# Patient Record
Sex: FEMALE | Race: WHITE | Employment: UNEMPLOYED | URBAN - METROPOLITAN AREA
[De-identification: names, ages, dates, MRNs, and addresses within clinical notes are randomized per-mention and may not be internally consistent; named-entity substitution may affect disease eponyms.]

---
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
## 2022-01-18 ENCOUNTER — TELEPHONE (OUTPATIENT)
Dept: FAMILY MEDICINE CLINIC | Age: 22
End: 2022-01-18

## 2022-01-18 RX ORDER — LEVONORGESTREL AND ETHINYL ESTRADIOL 0.1-0.02MG
1 KIT ORAL DAILY
Qty: 1 PACKET | Refills: 3 | Status: SHIPPED
Start: 2022-01-18 | End: 2022-05-04

## 2022-03-11 ENCOUNTER — OFFICE VISIT (OUTPATIENT)
Dept: FAMILY MEDICINE CLINIC | Age: 22
End: 2022-03-11
Payer: COMMERCIAL

## 2022-03-11 VITALS
HEART RATE: 62 BPM | OXYGEN SATURATION: 100 % | HEIGHT: 62 IN | TEMPERATURE: 98.1 F | DIASTOLIC BLOOD PRESSURE: 75 MMHG | WEIGHT: 136 LBS | SYSTOLIC BLOOD PRESSURE: 122 MMHG | BODY MASS INDEX: 25.03 KG/M2

## 2022-03-11 DIAGNOSIS — Z00.00 ROUTINE PHYSICAL EXAMINATION: ICD-10-CM

## 2022-03-11 DIAGNOSIS — F41.9 ANXIETY: Primary | ICD-10-CM

## 2022-03-11 PROCEDURE — G8420 CALC BMI NORM PARAMETERS: HCPCS

## 2022-03-11 PROCEDURE — G8427 DOCREV CUR MEDS BY ELIG CLIN: HCPCS

## 2022-03-11 PROCEDURE — 1036F TOBACCO NON-USER: CPT

## 2022-03-11 PROCEDURE — G8484 FLU IMMUNIZE NO ADMIN: HCPCS

## 2022-03-11 PROCEDURE — 99213 OFFICE O/P EST LOW 20 MIN: CPT

## 2022-03-11 RX ORDER — SERTRALINE HYDROCHLORIDE 25 MG/1
25 TABLET, FILM COATED ORAL DAILY
COMMUNITY
End: 2022-06-14 | Stop reason: SDUPTHER

## 2022-03-11 ASSESSMENT — ENCOUNTER SYMPTOMS
SHORTNESS OF BREATH: 0
BACK PAIN: 0
RHINORRHEA: 0
SORE THROAT: 0
COUGH: 0
EYE PAIN: 0
CONSTIPATION: 0
DIARRHEA: 0
CHEST TIGHTNESS: 0
WHEEZING: 0
ABDOMINAL PAIN: 0
NAUSEA: 0
VOMITING: 0

## 2022-03-11 NOTE — PROGRESS NOTES
[address] [address]  Department of Family Medicine  Family Medicine Residency Program      Patient: Paty Monteiro 25 y.o. female     Date of Service: 3/11/22      Chief complaint:   Chief Complaint   Patient presents with    Annual Exam     also anxiety follow up       HISTORY OF PRESENTING ILLNESS     25 y.o. female with a PMH of anxiety presented to the clinic for a  work physical     Patient is starting a new job at a  in May   Patient is excited about new position  Patient states that she is mentally fit and physically fit for position  Immunizations reviewed and up-to-date on MMR and Tdap       Anxiety  Patient is taking Zoloft 25 mg daily. States that her mood and symptoms have been stable. Denies side effects to medication.    Denies depressed mood, panic attacks, hopelessness, difficulty sleeping, changes in appetite, weight changes, anhedonia        Health Maintenance:  Health Maintenance Due   Topic Date Due    Hepatitis C screen  Never done    COVID-19 Vaccine (1) Never done    HIV screen  Never done    Chlamydia screen  Never done     Past Medical History:      Diagnosis Date    Seasonal allergic rhinitis 1/22/2014     Past Surgical History:        Procedure Laterality Date    ADENOIDECTOMY      TONSILLECTOMY      UPPER GASTROINTESTINAL ENDOSCOPY  2013    per Dr Jess Lentz     Allergies:    Davon Malachi [cefaclor], Levaquin [levofloxacin], Penicillins, and Seasonal  Social History:   Social History     Socioeconomic History    Marital status: Single     Spouse name: Not on file    Number of children: Not on file    Years of education: Not on file    Highest education level: Not on file   Occupational History    Not on file   Tobacco Use    Smoking status: Never Smoker    Smokeless tobacco: Never Used   Substance and Sexual Activity    Alcohol use: No     Alcohol/week: 0.0 standard drinks    Drug use: No    Sexual activity: Not on file   Other Topics Concern    Not on file Social History Narrative    Not on file     Social Determinants of Health     Financial Resource Strain: Low Risk     Difficulty of Paying Living Expenses: Not hard at all   Food Insecurity: No Food Insecurity    Worried About Running Out of Food in the Last Year: Never true    [address] in the Last Year: Never true   Transportation Needs:     Lack of Transportation (Medical): Not on file    Lack of Transportation (Non-Medical): Not on file   Physical Activity:     Days of Exercise per Week: Not on file    Minutes of Exercise per Session: Not on file   Stress:     Feeling of Stress : Not on file   Social Connections:     Frequency of Communication with Friends and Family: Not on file    Frequency of Social Gatherings with Friends and Family: Not on file    Attends Baptism Services: Not on file    Active Member of [address] [place] [address] or Organizations: Not on file    Attends Club or Organization Meetings: Not on file    Marital Status: Not on file   Intimate Partner Violence:     Fear of Current or Ex-Partner: Not on file    Emotionally Abused: Not on file    Physically Abused: Not on file    Sexually Abused: Not on file   Housing Stability:     Unable to Pay for Housing in the Last Year: Not on file    Number of [place] in the Last Year: Not on file    Unstable Housing in the Last Year: Not on file      Family History:       Problem Relation Age of Onset    Cancer Paternal Grandmother     Cancer Paternal Grandfather      Review of Systems:   Review of Systems   Constitutional: Negative for chills, fatigue and fever. HENT: Negative for congestion, hearing loss, rhinorrhea and sore throat. Eyes: Negative for pain and visual disturbance. Respiratory: Negative for cough, chest tightness, shortness of breath and wheezing. Cardiovascular: Negative for chest pain, palpitations and leg swelling. Gastrointestinal: Negative for abdominal pain, constipation, diarrhea, nausea and vomiting. Genitourinary: Negative for difficulty urinating, dysuria and hematuria. Musculoskeletal: Negative for arthralgias, back pain, myalgias and neck pain. Skin: Negative for rash and wound. Neurological: Negative for dizziness, weakness, light-headedness, numbness and headaches. Psychiatric/Behavioral: Negative for agitation, behavioral problems, confusion, decreased concentration, dysphoric mood, self-injury, sleep disturbance and suicidal ideas. The patient is not nervous/anxious and is not hyperactive. PHYSICAL EXAM   Vitals: /75   Pulse 62   Temp 98.1 °F (36.7 °C)   Ht 5' 2\" (1.575 m)   Wt 136 lb (61.7 kg)   SpO2 100%   BMI 24.87 kg/m²      Physical Exam  Vitals and nursing note reviewed. Constitutional:       General: She is not in acute distress. Appearance: Normal appearance. She is not ill-appearing. HENT:      Head: Normocephalic and atraumatic. Right Ear: Tympanic membrane and external ear normal.      Left Ear: Tympanic membrane and external ear normal.      Nose: Nose normal. No congestion or rhinorrhea. Mouth/Throat:      Mouth: Mucous membranes are moist.      Pharynx: Oropharynx is clear. No oropharyngeal exudate or posterior oropharyngeal erythema. Eyes:      General:         Right eye: No discharge. Left eye: No discharge. Extraocular Movements: Extraocular movements intact. Pupils: Pupils are equal, round, and reactive to light. Cardiovascular:      Rate and Rhythm: Normal rate and regular rhythm. Pulses: Normal pulses. Heart sounds: Normal heart sounds. No murmur heard. Pulmonary:      Effort: Pulmonary effort is normal. No respiratory distress. Breath sounds: Normal breath sounds. No stridor. No wheezing, rhonchi or rales. Abdominal:      General: Abdomen is flat. There is no distension. Palpations: Abdomen is soft. There is no mass. Tenderness: There is no abdominal tenderness.  There is no right CVA tenderness. Hernia: No hernia is present. Musculoskeletal:         General: Normal range of motion. Cervical back: Normal range of motion and neck supple. No tenderness. Right lower leg: No edema. Left lower leg: No edema. Lymphadenopathy:      Cervical: No cervical adenopathy. Skin:     General: Skin is warm and dry. Capillary Refill: Capillary refill takes less than 2 seconds. Neurological:      General: No focal deficit present. Mental Status: She is alert and oriented to person, place, and time. Cranial Nerves: No cranial nerve deficit. Sensory: No sensory deficit. Motor: No weakness. Coordination: Coordination normal.      Gait: Gait normal.      Deep Tendon Reflexes: Reflexes normal.   Psychiatric:         Mood and Affect: Mood normal.         Behavior: Behavior normal.         Thought Content: Thought content normal.         Judgment: Judgment normal.         ASSESSMENT AND PLAN     1. Anxiety  Stable on Zoloft 25 mg daily. Denies side effects to medication. 2. Routine physical examination  Patient physically fit for job  Up-to-date on immunizations      Counseled regarding above diagnosis, including possible risks and complications, especially if left uncontrolled. Counseled regarding the possible side effects, risks, benefits and alternatives to treatment; patient and/or guardian verbalizes understanding, agrees, feels comfortable with and wishes to proceed with above treatment plan. Call or go to ED immediately if symptoms worsen or persist. Advised patient to call with any new medication issues, and, as applicable, read all Rx info from pharmacy to assure aware of all possible risks and side effects of medication before taking. Patient and/or guardian given opportunity to ask questions/raise concerns. The patient verbalized comfort and understanding of instructions.     I encourage further reading and education about your health conditions. Information on many health conditions is provided by the American Academy of Family Physicians: https://familydoctor. org/  Please bring any questions to me at your next visit. Medication List:    Current Outpatient Medications   Medication Sig Dispense Refill    levonorgestrel-ethinyl estradiol (VIENVA) 0.1-20 MG-MCG per tablet Take 1 tablet by mouth daily 1 packet 3    sertraline (ZOLOFT) 50 MG tablet Take 1 tablet by mouth daily 90 tablet 1     No current facility-administered medications for this visit. Return to Office: Return in about 3 months (around 6/11/2022). This document may have been prepared at least partially through the use of voice recognition software. Although effort is taken to assure the accuracy of this document, it is possible that grammatical, syntax,  or spelling errors may occur.     Lupillo Lafleur MD PGY1

## 2022-03-16 NOTE — PROGRESS NOTES
[facility] [address]  Precepting Note    Subjective:  26 yo F with h/o anxiety, also needs work physical   No concerns. She is doing well on sertraline. Had a dose adjustment. No side effects at this time. No joint issues or current mood concerns. She is up to date with vaccines. No TB exposures or significant travel history. ROS otherwise as per resident note    Past medical, surgical, family and social history were reviewed, non-contributory, and unchanged unless otherwise stated. Objective:    /75   Pulse 62   Temp 98.1 °F (36.7 °C)   Ht 5' 2\" (1.575 m)   Wt 136 lb (61.7 kg)   SpO2 100%   BMI 24.87 kg/m²     Exam is as noted by resident with the following changes, additions or corrections:    General:  NAD; alert & oriented x 3   Heart:  RRR, no murmurs, gallops, or rubs. Lungs:  CTA bilaterally, no wheeze, rales or rhonchi  Extrem:  No clubbing, cyanosis, or edema    Assessment/Plan:    Anxiety - stable on sertraline  Work physical - paperwork completed     Attending Physician Statement  I have reviewed the chart, including any radiology or labs, and have seen the patient with the resident(s). I personally reviewed and performed key elements of the history and exam.  I agree with the assessment, plan and orders as documented by the resident. Please refer to the resident note for additional information.       Electronically signed by Cheng Leonardo MD on 3/16/2022 at 12:59 PM

## 2022-05-04 RX ORDER — LEVONORGESTREL AND ETHINYL ESTRADIOL 0.1-0.02MG
KIT ORAL
Qty: 28 TABLET | Refills: 5 | Status: SHIPPED
Start: 2022-05-04 | End: 2022-10-12

## 2022-05-16 ENCOUNTER — TELEPHONE (OUTPATIENT)
Dept: FAMILY MEDICINE CLINIC | Age: 22
End: 2022-05-16

## 2022-05-16 DIAGNOSIS — Z02.1 PHYSICAL EXAM, PRE-EMPLOYMENT: Primary | ICD-10-CM

## 2022-05-16 NOTE — TELEPHONE ENCOUNTER
Please call patient. 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 Does she have evidence of this? If not, I can do blood work that will show that she has had the vaccine. We need to get this done before I can complete the form. Thanks.   vadim

## 2022-06-13 NOTE — PROGRESS NOTES
Mustapha Montenegro (:  2000) is a 25 y.o. female,Established patient, here for evaluation of the following chief complaint(s): Anxiety         ASSESSMENT/PLAN:  1. Anxiety  -     sertraline (ZOLOFT) 25 MG tablet; Take 1 tablet by mouth daily, Disp-90 tablet, R-2Normal  Doing well on current dose. Cont on current dose. She will let me know if her anxiety increases when she goes back to school. Ed her on the use of MyChart. 2. Seasonal allergic rhinitis, unspecified trigger   - doing well on AllegraD. Return in about 1 year (around 2023) for well adult exam.         Subjective   SUBJECTIVE/OBJECTIVE:  HPI    Sees Dr. Yokasta Pritchard for GYN, OCP. Doing well on this. Not dating anyone. Allergies - takes allegra D. Anxiety - saw Dr. Jorden Snowden in March. Was working at a  in May. She likes it. She is at [employer]. She feels she can go out and do things with her friends. Going to be an . Excited about life. She is lifting. At her home. Squat, bench press. She decided not to increase her zoloft. She has to split the 50mg tabs. Doing well on the 25mg. Does not need the increased dose. Review of Systems   Constitutional: Negative for activity change, appetite change, chills, diaphoresis, fatigue, fever and unexpected weight change. Respiratory: Negative for chest tightness, shortness of breath and wheezing. Cardiovascular: Negative for chest pain and palpitations. Gastrointestinal: Negative for abdominal pain and nausea. Genitourinary: Negative for dysuria. Psychiatric/Behavioral: Negative for dysphoric mood. The patient is not nervous/anxious. Objective   Physical Exam  Vitals and nursing note reviewed. Constitutional:       Appearance: Normal appearance. She is well-developed. She is not ill-appearing or toxic-appearing. HENT:      Head: Normocephalic and atraumatic. Eyes:      General: No scleral icterus.

## 2022-06-14 ENCOUNTER — OFFICE VISIT (OUTPATIENT)
Dept: FAMILY MEDICINE CLINIC | Age: 22
End: 2022-06-14
Payer: COMMERCIAL

## 2022-06-14 VITALS
WEIGHT: 139.2 LBS | RESPIRATION RATE: 16 BRPM | BODY MASS INDEX: 24.66 KG/M2 | OXYGEN SATURATION: 99 % | DIASTOLIC BLOOD PRESSURE: 64 MMHG | HEIGHT: 63 IN | SYSTOLIC BLOOD PRESSURE: 104 MMHG | HEART RATE: 84 BPM | TEMPERATURE: 98.8 F

## 2022-06-14 DIAGNOSIS — F41.9 ANXIETY: ICD-10-CM

## 2022-06-14 DIAGNOSIS — J30.2 SEASONAL ALLERGIC RHINITIS, UNSPECIFIED TRIGGER: ICD-10-CM

## 2022-06-14 PROCEDURE — G8420 CALC BMI NORM PARAMETERS: HCPCS | Performed by: FAMILY MEDICINE

## 2022-06-14 PROCEDURE — G8427 DOCREV CUR MEDS BY ELIG CLIN: HCPCS | Performed by: FAMILY MEDICINE

## 2022-06-14 PROCEDURE — 1036F TOBACCO NON-USER: CPT | Performed by: FAMILY MEDICINE

## 2022-06-14 PROCEDURE — 99213 OFFICE O/P EST LOW 20 MIN: CPT | Performed by: FAMILY MEDICINE

## 2022-06-14 RX ORDER — SERTRALINE HYDROCHLORIDE 25 MG/1
25 TABLET, FILM COATED ORAL DAILY
Qty: 90 TABLET | Refills: 2 | Status: SHIPPED | OUTPATIENT
Start: 2022-06-14

## 2022-06-14 ASSESSMENT — ENCOUNTER SYMPTOMS
CHEST TIGHTNESS: 0
WHEEZING: 0
ABDOMINAL PAIN: 0
SHORTNESS OF BREATH: 0
NAUSEA: 0

## 2022-06-14 ASSESSMENT — PATIENT HEALTH QUESTIONNAIRE - PHQ9
SUM OF ALL RESPONSES TO PHQ QUESTIONS 1-9: 0
SUM OF ALL RESPONSES TO PHQ QUESTIONS 1-9: 0
SUM OF ALL RESPONSES TO PHQ9 QUESTIONS 1 & 2: 0
1. LITTLE INTEREST OR PLEASURE IN DOING THINGS: 0
SUM OF ALL RESPONSES TO PHQ QUESTIONS 1-9: 0
2. FEELING DOWN, DEPRESSED OR HOPELESS: 0
SUM OF ALL RESPONSES TO PHQ QUESTIONS 1-9: 0

## 2022-06-14 ASSESSMENT — ANXIETY QUESTIONNAIRES
2. NOT BEING ABLE TO STOP OR CONTROL WORRYING: 0
1. FEELING NERVOUS, ANXIOUS, OR ON EDGE: 0
6. BECOMING EASILY ANNOYED OR IRRITABLE: 0
IF YOU CHECKED OFF ANY PROBLEMS ON THIS QUESTIONNAIRE, HOW DIFFICULT HAVE THESE PROBLEMS MADE IT FOR YOU TO DO YOUR WORK, TAKE CARE OF THINGS AT HOME, OR GET ALONG WITH OTHER PEOPLE: NOT DIFFICULT AT ALL
GAD7 TOTAL SCORE: 0
5. BEING SO RESTLESS THAT IT IS HARD TO SIT STILL: 0
7. FEELING AFRAID AS IF SOMETHING AWFUL MIGHT HAPPEN: 0
3. WORRYING TOO MUCH ABOUT DIFFERENT THINGS: 0
4. TROUBLE RELAXING: 0

## 2022-10-12 RX ORDER — LEVONORGESTREL AND ETHINYL ESTRADIOL 0.1-0.02MG
KIT ORAL
Qty: 28 TABLET | Refills: 7 | Status: SHIPPED | OUTPATIENT
Start: 2022-10-12

## 2022-11-07 ENCOUNTER — OFFICE VISIT (OUTPATIENT)
Dept: PRIMARY CARE CLINIC | Age: 22
End: 2022-11-07

## 2022-11-07 VITALS
BODY MASS INDEX: 23.92 KG/M2 | TEMPERATURE: 98.6 F | WEIGHT: 135 LBS | DIASTOLIC BLOOD PRESSURE: 91 MMHG | SYSTOLIC BLOOD PRESSURE: 150 MMHG | RESPIRATION RATE: 18 BRPM | HEART RATE: 103 BPM | HEIGHT: 63 IN | OXYGEN SATURATION: 98 %

## 2022-11-07 DIAGNOSIS — J01.00 ACUTE NON-RECURRENT MAXILLARY SINUSITIS: Primary | ICD-10-CM

## 2022-11-07 PROCEDURE — 99213 OFFICE O/P EST LOW 20 MIN: CPT | Performed by: NURSE PRACTITIONER

## 2022-11-07 RX ORDER — DOXYCYCLINE HYCLATE 100 MG/1
100 CAPSULE ORAL 2 TIMES DAILY
Qty: 20 CAPSULE | Refills: 0 | Status: SHIPPED | OUTPATIENT
Start: 2022-11-07 | End: 2022-11-17

## 2022-11-07 NOTE — PROGRESS NOTES
Chief Complaint:   Sinusitis (Sore throat, cough, runny nose, congestion, bilateral ear pain- ongoing for 4-5 days)    History of Present Illness   Source of history provided by:  patient. Santos Roblero is a 23 y.o. old female who presents to walk-in for evaluation of sinus pressure, nasal congestion, discolored nasal drainage, bilateral ear pressure, mild productive cough and sore throat x 5 days. Has been taking OTC meds without relief. Denies any fever, chills, wheezing, CP, SOB, or GI symptoms. Denies any hx of asthma, COPD, or tobacco use. Review of Systems   Unless otherwise stated in this report or unable to obtain because of the patient's clinical or mental status as evidenced by the medical record, this patients's positive and negative responses for Review of Systems, constitutional, psych, eyes, ENT, cardiovascular, respiratory, gastrointestinal, neurological, genitourinary, musculoskeletal, integument systems and systems related to the presenting problem are either stated in the preceding or were negative for the symptoms and/or complaints related to the medical problem. Past Medical History:  has a past medical history of Seasonal allergic rhinitis. Past Surgical History:  has a past surgical history that includes Tonsillectomy; Adenoidectomy; and Upper gastrointestinal endoscopy (2013). Social History:  reports that she has never smoked. She has never used smokeless tobacco. She reports that she does not drink alcohol and does not use drugs. Family History: family history includes Cancer in her paternal grandfather and paternal grandmother.   Allergies: Ceclor [cefaclor], Levaquin [levofloxacin], Penicillins, and Seasonal    Physical Exam   Vital Signs:  BP (!) 150/91   Pulse (!) 103   Temp 98.6 °F (37 °C) (Oral)   Resp 18   Ht 5' 3\" (1.6 m)   Wt 135 lb (61.2 kg)   LMP 11/01/2022   SpO2 98%   BMI 23.91 kg/m²    Oxygen Saturation Interpretation: Normal.    Constitutional: Alert, development consistent with age. Head: There is mild TTP over the maxillary sinuses. Ears: Bilateral pinna normal. TMs clear without erythema or perforation bilaterally. Canals normal bilaterally without swelling or exudate  Nose:  There is mild congestion of the nasal mucosa. There is mild injection to middle turbinates bilaterally. Throat: There is mild posterior pharyngeal erythema with mild post nasal drip present. No exudate or tonsillar hypertrophy noted. Neck:  Supple. There is no anterior cervical adenopathy. Lungs: CTAB without wheezes, rales, or rhonchi  Heart:  Regular rate and rhythm, normal heart sounds, without pathological murmurs, ectopy, gallops, or rubs. Skin:  Normal turgor. Warm, dry, without visible rash. Neurological:  Alert and oriented. Motor functions intact. Responds to verbal commands. Test Results Section   (All laboratory and radiology results have been personally reviewed by myself)  Labs:  No results found for this visit on 11/07/22. Assessment / Plan   Impression(s):  Malika Ivan was seen today for sinusitis. Diagnoses and all orders for this visit:    Acute non-recurrent maxillary sinusitis  -     doxycycline hyclate (VIBRAMYCIN) 100 MG capsule; Take 1 capsule by mouth 2 times daily for 10 days    Script written for Doxycycline, side effects discussed. Increase fluids and rest. Symptomatic relief discussed. F/u PCP in 5-7 days if symptoms persist. ED sooner if symptoms worsen or change. Red flag symptoms discussed. Patient verbalized understanding and is in agreement with this care plan. All questions answered. Return if symptoms worsen or fail to improve. Electronically signed by AMBER White CNP   DD: 11/7/22    **This report was transcribed using voice recognition software. Every effort was made to ensure accuracy; however, inadvertent computerized transcription errors may be present.

## 2023-02-14 DIAGNOSIS — F41.9 ANXIETY: ICD-10-CM

## 2023-02-14 RX ORDER — SERTRALINE HYDROCHLORIDE 25 MG/1
25 TABLET, FILM COATED ORAL DAILY
Qty: 90 TABLET | Refills: 1 | Status: SHIPPED | OUTPATIENT
Start: 2023-02-14

## 2023-03-22 ENCOUNTER — OFFICE VISIT (OUTPATIENT)
Dept: PRIMARY CARE CLINIC | Age: 23
End: 2023-03-22

## 2023-03-22 VITALS
HEIGHT: 63 IN | OXYGEN SATURATION: 95 % | HEART RATE: 110 BPM | TEMPERATURE: 97.3 F | BODY MASS INDEX: 28.21 KG/M2 | SYSTOLIC BLOOD PRESSURE: 132 MMHG | WEIGHT: 159.2 LBS | DIASTOLIC BLOOD PRESSURE: 84 MMHG | RESPIRATION RATE: 20 BRPM

## 2023-03-22 DIAGNOSIS — J01.40 ACUTE NON-RECURRENT PANSINUSITIS: Primary | ICD-10-CM

## 2023-03-22 PROCEDURE — 99213 OFFICE O/P EST LOW 20 MIN: CPT | Performed by: NURSE PRACTITIONER

## 2023-03-22 RX ORDER — AZITHROMYCIN 250 MG/1
250 TABLET, FILM COATED ORAL SEE ADMIN INSTRUCTIONS
Qty: 6 TABLET | Refills: 0 | Status: SHIPPED | OUTPATIENT
Start: 2023-03-22 | End: 2023-03-27

## 2023-05-25 ENCOUNTER — TELEPHONE (OUTPATIENT)
Dept: FAMILY MEDICINE CLINIC | Age: 23
End: 2023-05-25

## 2023-05-25 RX ORDER — AZITHROMYCIN 500 MG/1
500 TABLET, FILM COATED ORAL DAILY
Qty: 3 TABLET | Refills: 0 | Status: CANCELLED | OUTPATIENT
Start: 2023-05-25 | End: 2023-05-28

## 2023-05-25 NOTE — TELEPHONE ENCOUNTER
I'm seeing her in June prior to their trip and can give her the script at that time. If she are leaving prior to that time, please let me know.   vadim

## 2023-05-25 NOTE — TELEPHONE ENCOUNTER
Pt father called, they have an upcoming trip out of country and would like to be prescribed zithromax. .     Sent to [address]

## 2023-05-30 RX ORDER — LEVONORGESTREL AND ETHINYL ESTRADIOL 0.1-0.02MG
KIT ORAL
Qty: 28 TABLET | Refills: 0 | Status: SHIPPED | OUTPATIENT
Start: 2023-05-30

## 2023-06-19 NOTE — PROGRESS NOTES
Nereyda Hernandez (:  2000) is a 23 y.o. female,Established patient, here for evaluation of the following chief complaint(s): Annual Exam         ASSESSMENT/PLAN:  1. Well adult exam  Pt exercising, eating a varied diet. Ed on getting enough calcium. Tdap today. Pt does not want HIV, Hep C testing. Will get pap and GC, eleazar done at GYN office. 2. Mixed anxiety and depressive disorder  -     sertraline (ZOLOFT) 50 MG tablet; Take 1 tablet by mouth daily, Disp-30 tablet, R-5Normal  Pt would like to increase her zoloft as she has periods of feeling sad. Zoloft increased from 25 to 50. She will RTC in 3 mn. 3. Traveler's diarrhea  -     azithromycin (ZITHROMAX) 500 MG tablet; Take 1 tablet by mouth daily for 3 days, Disp-3 tablet, R-0Normal  Pt going to [facility] in a few weeks. Return in about 3 months (around 2023) for depression. Subjective   SUBJECTIVE/OBJECTIVE:  HPI    Pt here for follow up. 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 Living at home. Likes living at home. No ETOH, no vaping. She lifts for exericse. Taking zoloft? She is taking it. She does get sad and in funks. It seems to help some but not quite enough. No feelings of anxiety. Is not affecting school. 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 Doesn't have a reason. No anxiety. Can do things with friends. Did not affect school. Not around her period. On OCP? Taking. No issues. She is sexualy activites. With current partner for about 1 year. Will see Dr. Brigid Cedillo in Aug for pap and GC/eleazar  Does not want HV or hep C screening. Going to [facility] - needs traveler's diarrhea meds. Review of Systems   Constitutional:  Negative for activity change, appetite change, chills, diaphoresis and fever. HENT:  Negative for congestion. Respiratory:  Negative for cough, chest tightness, shortness of breath and wheezing. Cardiovascular:  Negative for chest pain and palpitations.

## 2023-06-20 ENCOUNTER — OFFICE VISIT (OUTPATIENT)
Dept: FAMILY MEDICINE CLINIC | Age: 23
End: 2023-06-20
Payer: COMMERCIAL

## 2023-06-20 VITALS
HEART RATE: 100 BPM | SYSTOLIC BLOOD PRESSURE: 128 MMHG | BODY MASS INDEX: 27.32 KG/M2 | TEMPERATURE: 98.9 F | DIASTOLIC BLOOD PRESSURE: 72 MMHG | RESPIRATION RATE: 18 BRPM | HEIGHT: 63 IN | OXYGEN SATURATION: 98 % | WEIGHT: 154.2 LBS

## 2023-06-20 DIAGNOSIS — F41.8 MIXED ANXIETY AND DEPRESSIVE DISORDER: ICD-10-CM

## 2023-06-20 DIAGNOSIS — Z00.00 WELL ADULT EXAM: Primary | ICD-10-CM

## 2023-06-20 DIAGNOSIS — A09 TRAVELER'S DIARRHEA: ICD-10-CM

## 2023-06-20 PROBLEM — Z86.16 HISTORY OF COVID-19: Status: RESOLVED | Noted: 2021-08-11 | Resolved: 2023-06-20

## 2023-06-20 PROCEDURE — 90715 TDAP VACCINE 7 YRS/> IM: CPT | Performed by: FAMILY MEDICINE

## 2023-06-20 PROCEDURE — 90471 IMMUNIZATION ADMIN: CPT | Performed by: FAMILY MEDICINE

## 2023-06-20 PROCEDURE — 99395 PREV VISIT EST AGE 18-39: CPT | Performed by: FAMILY MEDICINE

## 2023-06-20 RX ORDER — AZITHROMYCIN 500 MG/1
500 TABLET, FILM COATED ORAL DAILY
Qty: 3 TABLET | Refills: 0 | Status: SHIPPED | OUTPATIENT
Start: 2023-06-20 | End: 2023-06-23

## 2023-06-20 ASSESSMENT — ENCOUNTER SYMPTOMS
CHEST TIGHTNESS: 0
WHEEZING: 0
COUGH: 0
SHORTNESS OF BREATH: 0

## 2023-06-20 ASSESSMENT — PATIENT HEALTH QUESTIONNAIRE - PHQ9
SUM OF ALL RESPONSES TO PHQ QUESTIONS 1-9: 1
1. LITTLE INTEREST OR PLEASURE IN DOING THINGS: 0
2. FEELING DOWN, DEPRESSED OR HOPELESS: 1
SUM OF ALL RESPONSES TO PHQ QUESTIONS 1-9: 1
SUM OF ALL RESPONSES TO PHQ9 QUESTIONS 1 & 2: 1

## 2023-07-10 RX ORDER — LEVONORGESTREL AND ETHINYL ESTRADIOL 0.1-0.02MG
1 KIT ORAL DAILY
Qty: 28 TABLET | Refills: 9 | Status: SHIPPED | OUTPATIENT
Start: 2023-07-10

## 2023-07-10 NOTE — TELEPHONE ENCOUNTER
Refill needed on:    Chip    At [facility]    Last visit 6/20/23    Next visit 10/3/23

## 2023-10-09 ENCOUNTER — OFFICE VISIT (OUTPATIENT)
Dept: PRIMARY CARE CLINIC | Age: 23
End: 2023-10-09

## 2023-10-09 VITALS
SYSTOLIC BLOOD PRESSURE: 124 MMHG | TEMPERATURE: 98.1 F | HEIGHT: 63 IN | RESPIRATION RATE: 16 BRPM | WEIGHT: 157.4 LBS | BODY MASS INDEX: 27.89 KG/M2 | HEART RATE: 100 BPM | OXYGEN SATURATION: 97 % | DIASTOLIC BLOOD PRESSURE: 87 MMHG

## 2023-10-09 DIAGNOSIS — J01.10 ACUTE NON-RECURRENT FRONTAL SINUSITIS: Primary | ICD-10-CM

## 2023-10-09 RX ORDER — FLUTICASONE PROPIONATE 50 MCG
1 SPRAY, SUSPENSION (ML) NASAL 2 TIMES DAILY
Qty: 16 G | Refills: 0 | Status: SHIPPED | OUTPATIENT
Start: 2023-10-09

## 2023-10-09 RX ORDER — AZITHROMYCIN 250 MG/1
250 TABLET, FILM COATED ORAL SEE ADMIN INSTRUCTIONS
Qty: 6 TABLET | Refills: 0 | Status: SHIPPED | OUTPATIENT
Start: 2023-10-09 | End: 2023-10-14

## 2023-12-27 ENCOUNTER — OFFICE VISIT (OUTPATIENT)
Dept: PRIMARY CARE CLINIC | Age: 23
End: 2023-12-27

## 2023-12-27 VITALS
WEIGHT: 145 LBS | HEIGHT: 63 IN | HEART RATE: 89 BPM | BODY MASS INDEX: 25.69 KG/M2 | TEMPERATURE: 98.6 F | RESPIRATION RATE: 16 BRPM | DIASTOLIC BLOOD PRESSURE: 67 MMHG | OXYGEN SATURATION: 97 % | SYSTOLIC BLOOD PRESSURE: 124 MMHG

## 2023-12-27 DIAGNOSIS — J01.40 ACUTE NON-RECURRENT PANSINUSITIS: Primary | ICD-10-CM

## 2023-12-27 PROCEDURE — 99213 OFFICE O/P EST LOW 20 MIN: CPT

## 2023-12-27 RX ORDER — DOXYCYCLINE HYCLATE 100 MG
100 TABLET ORAL 2 TIMES DAILY
Qty: 10 TABLET | Refills: 0 | Status: SHIPPED | OUTPATIENT
Start: 2023-12-27 | End: 2024-01-01

## 2023-12-27 RX ORDER — BENZONATATE 200 MG/1
200 CAPSULE ORAL 3 TIMES DAILY PRN
Qty: 30 CAPSULE | Refills: 0 | Status: SHIPPED | OUTPATIENT
Start: 2023-12-27 | End: 2024-01-06

## 2023-12-27 NOTE — PROGRESS NOTES
2023     Jesús Claudio 26 y.o. female    : 2000   Chief Complaint:   Cough (Cough for 5 weeks, sore throat, stuffy nose for the last 3 weeks. Took Robitussin, Delsom, Mucinex, Allegra, Dayquil/Nitequil \"nothing helps\" yellow drainage)      History of Present Illness   Source of history provided by:  patient. Jesús Claudio is a 26 y.o. old female who presents to walk-in for evaluation of cough x 5 weeks. Associated symptoms include congestion and sore throat. Since onset symptoms have been consistent. Patient has had no known Covid 19 exposure. Patient has not been diagnosed with COVID-19 in the last 90 days. Has taken Dayquil/Nyquil at home with some symptomatic relief. Denies any fever, chills, CP, dyspnea, LE edema, abdominal pain, nausea, vomiting, rash, dizziness, or lethargy. Denies any history of asthma, pneumonia, recurrent bronchitis or COPD. They have no history of tobacco abuse. ROS   Past Medical History:   Past Medical History:   Diagnosis Date    History of COVID-19 2021    In 2020. No symptoms. Positive test.    Seasonal allergic rhinitis 2014     Past Surgical History:  has a past surgical history that includes Tonsillectomy; Adenoidectomy; and Upper gastrointestinal endoscopy (). Social History:  reports that she has never smoked. She has never been exposed to tobacco smoke. She has never used smokeless tobacco. She reports that she does not drink alcohol and does not use drugs. Family History: family history includes Cancer in her paternal grandfather and paternal grandmother.    Allergies: Ceclor [cefaclor], Levaquin [levofloxacin], Penicillins, and Seasonal    Unless otherwise stated in this report the patient's positive and negative responses for review of systems for constitutional, eyes, ENT, cardiovascular, respiratory, gastrointestinal, neurological, , musculoskeletal, and integument systems and related systems to the

## 2024-02-09 DIAGNOSIS — F41.8 MIXED ANXIETY AND DEPRESSIVE DISORDER: ICD-10-CM

## 2024-04-03 RX ORDER — LEVONORGESTREL AND ETHINYL ESTRADIOL 0.1-0.02MG
1 KIT ORAL DAILY
Qty: 28 TABLET | Refills: 11 | Status: SHIPPED | OUTPATIENT
Start: 2024-04-03

## 2024-08-15 ENCOUNTER — OFFICE VISIT (OUTPATIENT)
Dept: PRIMARY CARE CLINIC | Age: 24
End: 2024-08-15

## 2024-08-15 DIAGNOSIS — Z00.01 ENCOUNTER FOR ANNUAL GENERAL MEDICAL EXAMINATION WITH ABNORMAL FINDINGS IN ADULT: Primary | ICD-10-CM

## 2024-08-15 DIAGNOSIS — F41.9 ANXIETY AND DEPRESSION: ICD-10-CM

## 2024-08-15 DIAGNOSIS — F32.A ANXIETY AND DEPRESSION: ICD-10-CM

## 2024-08-15 SDOH — HEALTH STABILITY: PHYSICAL HEALTH: ON AVERAGE, HOW MANY DAYS PER WEEK DO YOU ENGAGE IN MODERATE TO STRENUOUS EXERCISE (LIKE A BRISK WALK)?: 5 DAYS

## 2024-08-15 SDOH — HEALTH STABILITY: PHYSICAL HEALTH: ON AVERAGE, HOW MANY MINUTES DO YOU ENGAGE IN EXERCISE AT THIS LEVEL?: 70 MIN

## 2024-08-15 NOTE — PROGRESS NOTES
8/15/24  Name: Nidia Silva    : 2000    Sex: female    Age: 24 y.o.        Subjective:  Chief Complaint: Patient is here for establish as a new patient wellness    Patient feels well.  Her twin sister and parents are both establishing as new patient.  She has no symptoms.  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    HENT: Negative.     Eyes: Negative.    Respiratory: Negative.     Cardiovascular: Negative.    Gastrointestinal: Negative.    Endocrine: Negative.    Genitourinary: Negative.    Musculoskeletal: Negative.    Skin: Negative.    Allergic/Immunologic: Negative.    Neurological: Negative.    Hematological: Negative.    Psychiatric/Behavioral: Negative.           Current Outpatient Medications:     levonorgestrel-ethinyl estradiol (FALMINA) 0.1-20 MG-MCG per tablet, TAKE ONE TABLET BY MOUTH DAILY, Disp: 28 tablet, Rfl: 11    sertraline (ZOLOFT) 50 MG tablet, TAKE ONE TABLET BY MOUTH DAILY, Disp: 30 tablet, Rfl: 5    fluticasone (FLONASE) 50 MCG/ACT nasal spray, 1 spray by Each Nostril route in the morning and at bedtime, Disp: 16 g, Rfl: 0  Allergies   Allergen Reactions    Ceclor [Cefaclor] Hives    Levaquin [Levofloxacin] Hives     tendonitis    Penicillins Hives    Seasonal Other (See Comments)     congestion     Social

## 2024-08-16 VITALS
TEMPERATURE: 97.8 F | OXYGEN SATURATION: 98 % | WEIGHT: 153.2 LBS | HEART RATE: 86 BPM | SYSTOLIC BLOOD PRESSURE: 125 MMHG | RESPIRATION RATE: 17 BRPM | DIASTOLIC BLOOD PRESSURE: 68 MMHG | BODY MASS INDEX: 27.14 KG/M2

## 2024-08-16 PROBLEM — F32.A ANXIETY AND DEPRESSION: Status: ACTIVE | Noted: 2021-08-12

## 2024-08-16 PROBLEM — F32.A ANXIETY AND DEPRESSION: Chronic | Status: ACTIVE | Noted: 2021-08-12

## 2024-08-16 PROBLEM — F41.9 ANXIETY AND DEPRESSION: Chronic | Status: ACTIVE | Noted: 2021-08-12

## 2024-08-16 SDOH — ECONOMIC STABILITY: FOOD INSECURITY: WITHIN THE PAST 12 MONTHS, THE FOOD YOU BOUGHT JUST DIDN'T LAST AND YOU DIDN'T HAVE MONEY TO GET MORE.: NEVER TRUE

## 2024-08-16 SDOH — ECONOMIC STABILITY: INCOME INSECURITY: HOW HARD IS IT FOR YOU TO PAY FOR THE VERY BASICS LIKE FOOD, HOUSING, MEDICAL CARE, AND HEATING?: NOT HARD AT ALL

## 2024-08-16 SDOH — ECONOMIC STABILITY: FOOD INSECURITY: WITHIN THE PAST 12 MONTHS, YOU WORRIED THAT YOUR FOOD WOULD RUN OUT BEFORE YOU GOT MONEY TO BUY MORE.: NEVER TRUE

## 2024-08-16 ASSESSMENT — PATIENT HEALTH QUESTIONNAIRE - PHQ9
2. FEELING DOWN, DEPRESSED OR HOPELESS: NOT AT ALL
SUM OF ALL RESPONSES TO PHQ QUESTIONS 1-9: 0
SUM OF ALL RESPONSES TO PHQ QUESTIONS 1-9: 0
SUM OF ALL RESPONSES TO PHQ9 QUESTIONS 1 & 2: 0
SUM OF ALL RESPONSES TO PHQ QUESTIONS 1-9: 0
SUM OF ALL RESPONSES TO PHQ QUESTIONS 1-9: 0
1. LITTLE INTEREST OR PLEASURE IN DOING THINGS: NOT AT ALL

## 2024-08-16 ASSESSMENT — ENCOUNTER SYMPTOMS
EYES NEGATIVE: 1
ALLERGIC/IMMUNOLOGIC NEGATIVE: 1
GASTROINTESTINAL NEGATIVE: 1
RESPIRATORY NEGATIVE: 1

## 2024-08-23 DIAGNOSIS — F41.8 MIXED ANXIETY AND DEPRESSIVE DISORDER: ICD-10-CM

## 2024-09-30 DIAGNOSIS — F41.8 MIXED ANXIETY AND DEPRESSIVE DISORDER: ICD-10-CM

## 2025-03-11 RX ORDER — TIMOLOL MALEATE 5 MG/ML
1 SOLUTION/ DROPS OPHTHALMIC DAILY
Qty: 28 TABLET | Refills: 0 | OUTPATIENT
Start: 2025-03-11

## 2025-03-17 RX ORDER — LEVONORGESTREL/ETHIN.ESTRADIOL 0.1-0.02MG
1 TABLET ORAL DAILY
Qty: 28 TABLET | Refills: 11 | Status: SHIPPED | OUTPATIENT
Start: 2025-03-17

## 2025-03-17 NOTE — TELEPHONE ENCOUNTER
Name of Medication(s) Requested:  Requested Prescriptions      No prescriptions requested or ordered in this encounter       Medication is on current medication list Yes    Dosage and directions were verified? Yes    Quantity verified: 30 day supply     Pharmacy Verified?  Yes    Last Appointment:  8/15/2024    Future appts:  No future appointments.     (If no appt send self scheduling link. .REFILLAPPT)  Scheduling request sent?     [] Yes  [] No    Does patient need updated?  [] Yes  [] No

## 2025-04-08 DIAGNOSIS — F41.8 MIXED ANXIETY AND DEPRESSIVE DISORDER: ICD-10-CM

## 2025-04-24 ENCOUNTER — TELEPHONE (OUTPATIENT)
Dept: PRIMARY CARE CLINIC | Age: 25
End: 2025-04-24

## 2025-04-24 DIAGNOSIS — T78.40XA ALLERGY, INITIAL ENCOUNTER: Primary | ICD-10-CM

## 2025-05-05 ENCOUNTER — OFFICE VISIT (OUTPATIENT)
Dept: FAMILY MEDICINE CLINIC | Age: 25
End: 2025-05-05
Payer: COMMERCIAL

## 2025-05-05 VITALS
OXYGEN SATURATION: 97 % | HEIGHT: 63 IN | SYSTOLIC BLOOD PRESSURE: 104 MMHG | TEMPERATURE: 97.1 F | WEIGHT: 156.8 LBS | DIASTOLIC BLOOD PRESSURE: 74 MMHG | HEART RATE: 75 BPM | BODY MASS INDEX: 27.78 KG/M2

## 2025-05-05 DIAGNOSIS — R09.81 NASAL CONGESTION: ICD-10-CM

## 2025-05-05 DIAGNOSIS — M79.10 MYALGIA: ICD-10-CM

## 2025-05-05 DIAGNOSIS — R51.9 SINUS HEADACHE: ICD-10-CM

## 2025-05-05 DIAGNOSIS — R09.82 POSTNASAL DRIP: ICD-10-CM

## 2025-05-05 DIAGNOSIS — J01.90 ACUTE NON-RECURRENT SINUSITIS, UNSPECIFIED LOCATION: Primary | ICD-10-CM

## 2025-05-05 PROCEDURE — 99203 OFFICE O/P NEW LOW 30 MIN: CPT | Performed by: PHYSICIAN ASSISTANT

## 2025-05-05 RX ORDER — PREDNISONE 10 MG/1
TABLET ORAL
Qty: 18 TABLET | Refills: 0 | Status: SHIPPED | OUTPATIENT
Start: 2025-05-05

## 2025-05-05 RX ORDER — DOXYCYCLINE HYCLATE 100 MG
100 TABLET ORAL 2 TIMES DAILY
Qty: 20 TABLET | Refills: 0 | Status: SHIPPED | OUTPATIENT
Start: 2025-05-05 | End: 2025-05-15

## 2025-05-05 NOTE — PROGRESS NOTES
questions or concerns at this time the patient will be discharged home.      Clinical Impression:   Nidia was seen today for fatigue, headache and generalized body aches.    Diagnoses and all orders for this visit:    Acute non-recurrent sinusitis, unspecified location    Postnasal drip    Nasal congestion    Myalgia    Sinus headache    Other orders  -     doxycycline hyclate (VIBRA-TABS) 100 MG tablet; Take 1 tablet by mouth 2 times daily for 10 days  -     predniSONE (DELTASONE) 10 MG tablet; 3 tabs once daily for 3 days, 2 tabs once daily for 3 days, 1 tab once daily for 3 days        The patient is to call for any concerns or return if any of the signs or symptoms worsen. The patient is to follow-up with PCP in the next 2-3 days for repeat evaluation repeat assessment or go directly to the emergency department.     SIGNATURE: Montez Demarco III, PA-C    This document may have been prepared at least partially through the use of voice recognition software. Although effort is taken to assure the accuracy ofthis document, it is possible that grammatical, syntax, or spelling errors may occur.     **This report was transcribed using voice recognition software. The patient (or guardian, if applicable) and other individuals in attendance with the patient were advised that if Artificial Intelligence would be utilized during this visit to record and process the conversation to generate a clinical note they would be informed prior to start of the visit. The patient (or guardian, if applicable) and other individuals in attendance at the appointment consented to the use of AI if was utilized, including the recording.  Every effort was made to ensure accuracy; however, inadvertent computerized transcription errors may be present.